# Patient Record
Sex: MALE | Race: WHITE
[De-identification: names, ages, dates, MRNs, and addresses within clinical notes are randomized per-mention and may not be internally consistent; named-entity substitution may affect disease eponyms.]

---

## 2017-09-01 ENCOUNTER — HOSPITAL ENCOUNTER (OUTPATIENT)
Dept: HOSPITAL 11 - JP.ED | Age: 72
Setting detail: OBSERVATION
LOS: 3 days | Discharge: HOME | End: 2017-09-04
Attending: INTERNAL MEDICINE | Admitting: FAMILY MEDICINE
Payer: COMMERCIAL

## 2017-09-01 DIAGNOSIS — Z95.5: ICD-10-CM

## 2017-09-01 DIAGNOSIS — I10: ICD-10-CM

## 2017-09-01 DIAGNOSIS — E78.5: ICD-10-CM

## 2017-09-01 DIAGNOSIS — F17.210: ICD-10-CM

## 2017-09-01 DIAGNOSIS — E11.9: ICD-10-CM

## 2017-09-01 DIAGNOSIS — R06.02: ICD-10-CM

## 2017-09-01 DIAGNOSIS — Z79.2: ICD-10-CM

## 2017-09-01 DIAGNOSIS — Z79.4: ICD-10-CM

## 2017-09-01 DIAGNOSIS — J44.9: ICD-10-CM

## 2017-09-01 DIAGNOSIS — G47.33: ICD-10-CM

## 2017-09-01 DIAGNOSIS — Z79.899: ICD-10-CM

## 2017-09-01 DIAGNOSIS — A77.49: Primary | ICD-10-CM
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
## 2017-09-01 PROCEDURE — 71010: CPT

## 2017-09-01 PROCEDURE — 36600 WITHDRAWAL OF ARTERIAL BLOOD: CPT

## 2017-09-01 PROCEDURE — 36415 COLL VENOUS BLD VENIPUNCTURE: CPT

## 2017-09-01 PROCEDURE — 96366 THER/PROPH/DIAG IV INF ADDON: CPT

## 2017-09-01 PROCEDURE — 82962 GLUCOSE BLOOD TEST: CPT

## 2017-09-01 PROCEDURE — 83880 ASSAY OF NATRIURETIC PEPTIDE: CPT

## 2017-09-01 PROCEDURE — 81001 URINALYSIS AUTO W/SCOPE: CPT

## 2017-09-01 PROCEDURE — 84484 ASSAY OF TROPONIN QUANT: CPT

## 2017-09-01 PROCEDURE — 99285 EMERGENCY DEPT VISIT HI MDM: CPT

## 2017-09-01 PROCEDURE — 82803 BLOOD GASES ANY COMBINATION: CPT

## 2017-09-01 PROCEDURE — 94640 AIRWAY INHALATION TREATMENT: CPT

## 2017-09-01 PROCEDURE — 80053 COMPREHEN METABOLIC PANEL: CPT

## 2017-09-01 PROCEDURE — 80048 BASIC METABOLIC PNL TOTAL CA: CPT

## 2017-09-01 PROCEDURE — 86140 C-REACTIVE PROTEIN: CPT

## 2017-09-01 PROCEDURE — 85027 COMPLETE CBC AUTOMATED: CPT

## 2017-09-01 PROCEDURE — 83605 ASSAY OF LACTIC ACID: CPT

## 2017-09-01 PROCEDURE — G0378 HOSPITAL OBSERVATION PER HR: HCPCS

## 2017-09-01 PROCEDURE — 86618 LYME DISEASE ANTIBODY: CPT

## 2017-09-01 PROCEDURE — 96375 TX/PRO/DX INJ NEW DRUG ADDON: CPT

## 2017-09-01 PROCEDURE — 86753 PROTOZOA ANTIBODY NOS: CPT

## 2017-09-01 PROCEDURE — 86666 EHRLICHIA ANTIBODY: CPT

## 2017-09-01 PROCEDURE — 96361 HYDRATE IV INFUSION ADD-ON: CPT

## 2017-09-01 PROCEDURE — 96367 TX/PROPH/DG ADDL SEQ IV INF: CPT

## 2017-09-01 PROCEDURE — 96365 THER/PROPH/DIAG IV INF INIT: CPT

## 2017-09-01 PROCEDURE — 85025 COMPLETE CBC W/AUTO DIFF WBC: CPT

## 2017-09-01 RX ADMIN — INSULIN DETEMIR SCH UNIT: 100 INJECTION, SOLUTION SUBCUTANEOUS at 22:27

## 2017-09-01 RX ADMIN — METOPROLOL TARTRATE SCH MG: 50 TABLET, FILM COATED ORAL at 22:28
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
## 2017-09-02 RX ADMIN — METOPROLOL TARTRATE SCH: 50 TABLET, FILM COATED ORAL at 13:01

## 2017-09-02 RX ADMIN — ATORVASTATIN CALCIUM SCH MG: 20 TABLET, FILM COATED ORAL at 20:12

## 2017-09-02 RX ADMIN — Medication SCH MG: at 20:16

## 2017-09-02 RX ADMIN — MULTIPLE VITAMINS W/ MINERALS TAB SCH: TAB at 13:01

## 2017-09-02 RX ADMIN — Medication SCH PUFF: at 20:19

## 2017-09-02 RX ADMIN — Medication SCH CAP: at 09:28

## 2017-09-02 RX ADMIN — TRAZODONE HYDROCHLORIDE SCH MG: 50 TABLET ORAL at 20:12

## 2017-09-02 RX ADMIN — INSULIN DETEMIR SCH UNIT: 100 INJECTION, SOLUTION SUBCUTANEOUS at 20:23

## 2017-09-02 RX ADMIN — METOPROLOL TARTRATE SCH MG: 50 TABLET, FILM COATED ORAL at 20:13

## 2017-09-02 RX ADMIN — Medication SCH EACH: at 20:15

## 2017-09-02 RX ADMIN — LATANOPROST SCH DROP: 50 SOLUTION OPHTHALMIC at 20:17

## 2017-09-02 RX ADMIN — Medication SCH PUFF: at 09:29

## 2017-09-02 RX ADMIN — Medication SCH EACH: at 00:05
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
## 2017-09-03 RX ADMIN — Medication SCH: at 21:30

## 2017-09-03 RX ADMIN — Medication SCH CAP: at 08:47

## 2017-09-03 RX ADMIN — TRAZODONE HYDROCHLORIDE SCH MG: 50 TABLET ORAL at 21:31

## 2017-09-03 RX ADMIN — INSULIN DETEMIR SCH UNIT: 100 INJECTION, SOLUTION SUBCUTANEOUS at 21:27

## 2017-09-03 RX ADMIN — MULTIPLE VITAMINS W/ MINERALS TAB SCH TAB: TAB at 12:18

## 2017-09-03 RX ADMIN — METOPROLOL TARTRATE SCH MG: 50 TABLET, FILM COATED ORAL at 08:48

## 2017-09-03 RX ADMIN — METOPROLOL TARTRATE SCH MG: 50 TABLET, FILM COATED ORAL at 21:29

## 2017-09-03 RX ADMIN — Medication SCH PUFF: at 07:17

## 2017-09-03 RX ADMIN — LATANOPROST SCH DROP: 50 SOLUTION OPHTHALMIC at 21:33

## 2017-09-03 RX ADMIN — ATORVASTATIN CALCIUM SCH MG: 20 TABLET, FILM COATED ORAL at 21:28

## 2017-09-03 RX ADMIN — Medication SCH PUFF: at 21:26

## 2017-09-03 RX ADMIN — Medication SCH EACH: at 08:47

## 2017-09-03 RX ADMIN — Medication SCH MG: at 21:32
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
## 2017-09-04 VITALS — SYSTOLIC BLOOD PRESSURE: 140 MMHG | DIASTOLIC BLOOD PRESSURE: 68 MMHG

## 2017-09-04 RX ADMIN — Medication SCH PUFF: at 07:30

## 2017-09-04 RX ADMIN — Medication SCH EACH: at 09:12

## 2017-09-04 RX ADMIN — METOPROLOL TARTRATE SCH MG: 50 TABLET, FILM COATED ORAL at 09:08

## 2017-09-04 RX ADMIN — MULTIPLE VITAMINS W/ MINERALS TAB SCH TAB: TAB at 09:13

## 2017-09-04 RX ADMIN — Medication SCH CAP: at 09:11
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
## 2017-09-05 NOTE — CR
Chest 1V Frontal

 

HISTORY: Fever, shortness of breath.

 

COMPARISON: CT scan 8/31/2017

 

FINDINGS: There is mild cardiomegaly. Borderline congestive change. No dense infiltrate or effusion.
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
## 2019-08-08 NOTE — CRLCR
INDICATION:



Fall with left hip pain.



TECHNIQUE:



AP view pelvis, 2 views of the left hip.



COMPARISON:



None.



FINDINGS:



Hardware components of left total hip arthroplasty. Subtrochanteric 

obliquely oriented periprosthetic left femur fracture. Mild lateral 

displacement of the distal fracture fragment measuring approximately 1 cm. 

No evidence of dislocation of the left hip joint. Moderate to severe right 

hip joint space narrowing and osteophyte formation. Surgical clips project 

over the pelvis. Bones are demineralized.



IMPRESSION:



Periprosthetic subtrochanteric left femur fracture as above.



Dictated by Mehul Hurd MD @ Aug  8 2019 11:40AM



Signed by Dr. Mehul Hurd @ Aug  8 2019 11:44AM
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
## 2019-09-03 NOTE — CRLCR
Indication:



Injury and pain



Technique:



Left hip 3 views



Comparison:



August 30, 2019 



Findings:



Bones: Hardware from a left hip arthroplasty remains in satisfactory 

position. Again demonstrated is an incompletely healed subtrochanteric 

fracture. Subtle nondisplaced fracture adjacent to the tip of the 

intramedullary skylar is unchanged. No new or worsening fracture. Alignment is 

normal. 



Soft tissues: Unremarkable.  



Impression:



No new abnormality and no change from the prior exam.



Dictated by Capo Holly MD @ Sep  3 2019  5:50PM



Signed by Dr. Capo Holly @ Sep  3 2019  5:54PM

## 2019-12-09 NOTE — EDM.PDOC
ED HPI GENERAL MEDICAL PROBLEM





- General


Chief Complaint: Lower Extremity Injury/Pain


Stated Complaint: FELL AT HOME


Time Seen by Provider: 12/09/19 08:11


Source of Information: Reports: Patient, EMS


History Limitations: Reports: No Limitations





- History of Present Illness


INITIAL COMMENTS - FREE TEXT/NARRATIVE: 





73-year-old male with left hip pain after falling in the shower.  He has 

apparent deformity of the left lower extremity and severe pain just below the 

left hip.  No other injury.


Onset: Sudden


Duration: Hour(s): (Within the last 2 hours)


Location: Reports: Lower Extremity, Left


Associated Symptoms: Reports: Confusion


  ** Left Hip


Pain Score (Numeric/FACES): 8





- Related Data


 Allergies











Allergy/AdvReac Type Severity Reaction Status Date / Time


 


No Known Allergies Allergy   Verified 12/09/19 08:22











Home Meds: 


 Home Meds





Albuterol [Ventolin HFA] 2 puff INH Q4H PRN 03/31/15 [History]


Insulin Glarg,Human.Rec.Analog [Lantus Solostar] 30 unit SUBCUT BEDTIME 03/31/

15 [History]


Lisinopril 20 mg PO DAILY 03/31/15 [History]


Metoprolol Tartrate 50 mg PO BID 03/31/15 [History]


amLODIPine [Norvasc] 10 mg PO BEDTIME 03/31/15 [History]


atorvaSTATin [Lipitor] 40 mg PO BEDTIME 03/31/15 [History]


Latanoprost [Xalatan 0.005% Ophth Soln] 1 drop EYEBOTH DAILY 08/31/17 [History]


Nitroglycerin 0.4 mg SL ASDIRECTED PRN 03/15/19 [History]


Pramipexole Di-HCl [Mirapex] 0.25 mg PO DAILY 03/15/19 [History]


traZODone HCl [Trazodone HCl] 50 mg PO DAILY 03/15/19 [History]


*Potassium Chloride 99 mg PO BID 12/09/19 [History]


Aspirin [Halfprin] 1 tab PO BEDTIME 12/09/19 [History]


Budesonide/Formoterol [Symbicort 80-4.5 MCG] 2 puff INH BID 12/09/19 [History]


Cholecalciferol (Vitamin D3) [Vitamin D3] 1 tab PO DAILY 12/09/19 [History]


Escitalopram [Lexapro] 5 mg PO BEDTIME 12/09/19 [History]


Furosemide [Lasix] 20 mg PO DAILY 12/09/19 [History]


Glucosam/Chond/Collagen/Hyalur [Glucosamine Chondroitin] 2 cap PO DAILY 12/09/ 19 [History]


Multivitamin [Multivitamins] 1 tab PO DAILY 12/09/19 [History]


Naproxen 1 tab PO BID PRN 12/09/19 [History]


Vitamin B Complex [Super B-50 Complex] 1 cap PO DAILY 12/09/19 [History]











Past Medical History


HEENT History: Reports: Cataract, Glaucoma, Hard of Hearing, Impaired Vision


Cardiovascular History: Reports: High Cholesterol, Hypertension


Respiratory History: Reports: COPD, Sleep Apnea


Gastrointestinal History: Reports: None


Musculoskeletal History: Reports: Back Pain, Chronic, Osteoarthritis, Other (

See Below)


Other Musculoskeletal History: L hip pain


Neurological History: Reports: CVA


Psychiatric History: Reports: None


Endocrine/Metabolic History: Reports: Diabetes, Type II


Hematologic History: Reports: None


Immunologic History: Reports: None


Oncologic (Cancer) History: Reports: Colon, Metastatic, Prostate, Renal


Dermatologic History: Reports: None





- Past Surgical History


Head Surgeries/Procedures: Reports: None


HEENT Surgical History: Reports: Cataract Surgery


Cardiovascular Surgical History: Reports: Coronary Artery Stent


GI Surgical History: Reports: Colonoscopy, ERCP


Male  Surgical History: Reports: Other (See Below)


Musculoskeletal Surgical History: Reports: Hip Replacement





Social & Family History





- Caffeine Use


Caffeine Use: Reports: Coffee





Review of Systems





- Review of Systems


Review Of Systems: See Below


Constitutional: Denies: Fever


Respiratory: Denies: Shortness of Breath


Cardiovascular: Denies: Chest Pain, Palpitations


Musculoskeletal: Reports: Other (Severe left hip and leg pain, especially with 

movement)


Skin: Denies: Bruising





ED EXAM, GENERAL





- Physical Exam


Exam: See Below


Exam Limited By: No Limitations


General Appearance: Alert, Moderate Distress (Moderate to severe distress with 

any movement, fairly comfortable when lying still)


Respiratory/Chest: No Respiratory Distress


Cardiovascular: Regular Rate, Rhythm


GI/Abdominal: Soft, Non-Tender


Extremities: Other (Left leg shows internal rotation and slight shortening, 

severe tenderness to any palpation of the mid to upper femur left side)





Course





- Vital Signs


Last Recorded V/S: 


 Last Vital Signs











Temp  97.3 F   12/09/19 08:22


 


Pulse  83   12/09/19 10:14


 


Resp  16   12/09/19 10:14


 


BP  143/66 H  12/09/19 10:14


 


Pulse Ox  92 L  12/09/19 10:14














- Orders/Labs/Meds


Meds: 


Medications














Discontinued Medications














Generic Name Dose Route Start Last Admin





  Trade Name Karl  PRN Reason Stop Dose Admin


 


Hydromorphone HCl  1 mg  12/09/19 09:34  12/09/19 09:43





  Dilaudid  IVPUSH  12/09/19 09:35  1 mg





  ONETIME ONE   Administration





     





     





     





     














- Re-Assessments/Exams


Free Text/Narrative Re-Assessment/Exam: 





12/09/19 08:52


X-ray confirmed a new spiral fracture through the femur at the distal and of 

his prosthesis.  These films were reviewed by our orthopedic surgeon Dr. Fairchild.


12/09/19 10:16


Our facility does not have the proper orthopedic supplies to complete the 

surgery.  [facility] in [place] was contacted, Dr. Freeman kindly accepted his 

transfer at 09:55, he will be reevaluated in the emergency room prior to 

admission for orthopedic assessment and care.  An additional 1 mg of IV 

Dilaudid was needed.








Departure





- Departure


Time of Disposition: 10:59


Disposition: DC/Tfer to Other 


Clinical Impression: 


Periprosthetic fracture of hip


Qualifiers:


 Encounter type: initial encounter Qualified Code(s): M97.8XXA - Periprosthetic 

fracture around other internal prosthetic joint, initial encounter








- Discharge Information


Referrals: 


PCP,None [Primary Care Provider] - 


Forms:  ED Department Discharge


Care Plan Goals: 


Patient is to be transferred by EMS to [facility] for orthopedic evaluation 

and definitive care for a spiral left femur fracture, periprosthetic.

## 2019-12-09 NOTE — CR
Hip Min 2V or 3V Lt

 

CLINICAL HISTORY: Left hip pain

 

FINDINGS: Patient has a total left hip arthroplasty. There has been revision

with a subtrochanteric fracture that was reduced. The there is now a new oblique

displaced fracture of the mid femur towards the distal aspect of the

intramedullary skylar

 

IMPRESSION: New oblique displaced fracture through the mid femoral shaft near

the distal aspect of the intramedullary skylar.

## 2020-10-29 NOTE — EDM.PDOC
ED HPI GENERAL MEDICAL PROBLEM





- General


Chief Complaint: Back Pain or Injury


Stated Complaint: BACK PAIN


Time Seen by Provider: 10/29/20 11:50


Source of Information: Reports: Patient, Family


History Limitations: Reports: Other (Patient has Alzheimer's dementia limiting 

the full scope of his history.)





- History of Present Illness


INITIAL COMMENTS - FREE TEXT/NARRATIVE: 


Nabor is a 74-year-old male who is a [facility] patient presenting to the ED for 

evaluation of low back pain.  The patient reportedly had a fall 3 weeks ago 

resulting in acute low back pain that has been migrating between the lumbar and 

thoracic spine and worsening.  The patient has Alzheimer's dementia limiting his

reporting of pain, however, his daughter accompanies him and states that he has 

been having increasing pain especially in the low back since the fall.  The 

patient cries out with any jarring movements like hitting bumps in the vehicle. 

He has not had any change of bowel or bladder control issues.  He normally 

attends the [facility], however, they referred him to the local ER for 

evaluation.  The patient does have a history of radiation to the lower spine for

prostate cancer.  The daughter is also concerned that perhaps there is 

metastasis of the prostate cancer to the lumbar spine causing the pain.  He 

denies any numbness or tingling in the legs.  He denies any weakness of the leg

s.  He denies any saddle anesthesia.  He denies any other injury from the fall.





Context: Reports: Trauma





- Related Data


                                    Allergies











Allergy/AdvReac Type Severity Reaction Status Date / Time


 


No Known Allergies Allergy   Verified 10/29/20 11:48











Home Meds: 


                                    Home Meds





Albuterol [Ventolin HFA] 2 puff INH Q4H PRN 03/31/15 [History]


Insulin Glarg,Human.Rec.Analog [Lantus Solostar] 30 unit SUBCUT BEDTIME 03/31/15

[History]


Lisinopril 10 mg PO DAILY 03/31/15 [History]


Metoprolol Tartrate 100 mg PO BID 03/31/15 [History]


amLODIPine [Norvasc] 5 mg PO BEDTIME 03/31/15 [History]


atorvaSTATin [Lipitor] 80 mg PO BEDTIME 03/31/15 [History]


Latanoprost [Xalatan 0.005% Ophth Soln] 1 drop EYEBOTH DAILY 08/31/17 [History]


Nitroglycerin 0.4 mg SL ASDIRECTED PRN 03/15/19 [History]


Pramipexole Di-HCl [Mirapex] 0.25 mg PO DAILY 03/15/19 [History]


traZODone HCl [Trazodone HCl] 100 mg PO DAILY 03/15/19 [History]


*Potassium Chloride 99 mg PO DAILY 12/09/19 [History]


Aspirin [Halfprin] 81 mg PO BEDTIME 12/09/19 [History]


Budesonide/Formoterol [Symbicort 80-4.5 MCG] 2 puff INH BID 12/09/19 [History]


Cholecalciferol (Vitamin D3) [Vitamin D3] 6,000 unit PO DAILY 12/09/19 [History]


Escitalopram [Lexapro] 10 mg PO BEDTIME 12/09/19 [History]


Furosemide [Lasix] 20 mg PO DAILY 12/09/19 [History]


Glucosam/Chond/Collagen/Hyalur [Glucosamine Chondroitin] 1 cap PO DAILY 12/09/19

[History]


Multivitamin [Multivitamins] 1 tab PO DAILY 12/09/19 [History]


Naproxen 1 tab PO BID PRN 12/09/19 [History]


Vitamin B Complex [Super B-50 Complex] 1 cap PO DAILY 12/09/19 [History]


Acetaminophen [Tylenol Extra Strength] 1,000 mg PO Q6H PRN 01/27/20 [History]


Calcium Carbonate/Vitamin D3 [Calcium 600 + Vit D 200] 1 tab PO DAILY 01/27/20 

[History]


Clopidogrel [Plavix] 75 mg PO DAILY 01/27/20 [History]


Leuprolide [Lupron Depot] 22.5 mg IM .EVERY 6 MONTHS 01/27/20 [History]


Lidocaine [Lidoderm] 1 patch TOP Q12H 01/27/20 [History]


Loperamide [Imodium] 2 mg PO ASDIRECTED 01/27/20 [History]


Cyclobenzaprine HCl 10 mg PO Q8HR PRN 30 Days #30 tablet 10/29/20 [Rx]











Past Medical History


HEENT History: Reports: Cataract, Glaucoma, Hard of Hearing, Impaired Vision


Cardiovascular History: Reports: High Cholesterol, Hypertension, Stents


Respiratory History: Reports: COPD, Sleep Apnea


Gastrointestinal History: Reports: None


Genitourinary History: Reports: Urinary Incontinence


Musculoskeletal History: Reports: Back Pain, Chronic, Osteoarthritis, Other (See

 Below)


Other Musculoskeletal History: L hip pain


Neurological History: Reports: Alzheimers Disease, CVA, Other (See Below)


Other Neuro History: dementia


Psychiatric History: Reports: None


Endocrine/Metabolic History: Reports: Diabetes, Type II


Hematologic History: Reports: Anticoagulation Therapy, Blood Transfusion(s)


Immunologic History: Reports: None


Oncologic (Cancer) History: Reports: Colon, Metastatic, Prostate, Renal


Dermatologic History: Reports: None





- Past Surgical History


Head Surgeries/Procedures: Reports: None


HEENT Surgical History: Reports: Cataract Surgery


Cardiovascular Surgical History: Reports: Coronary Artery Stent


Respiratory Surgical History: Reports: None


GI Surgical History: Reports: Colonoscopy, ERCP


Endocrine Surgical History: Reports: None


Musculoskeletal Surgical History: Reports: Hip Replacement


Other Musculoskeletal Surgeries/Procedures:: left hip and femur ORIF


Oncologic Surgical History: Reports: None


Dermatological Surgical History: Reports: None





Social & Family History





- Tobacco Use


Tobacco Use Status *Q: Current Every Day Tobacco User


Years of Tobacco use: 60


Packs/Tins Daily: 1


Used Tobacco, but Quit: No


Second Hand Smoke Exposure: No





- Caffeine Use


Caffeine Use: Reports: Coffee


Other Caffeine Use: 2 cups





- Alcohol Use


Days Per Week of Alcohol Use: 7


Number of Drinks Per Day: 2


Total Drinks Per Week: 14





- Recreational Drug Use


Recreational Drug Use: No





ED ROS GENERAL





- Review of Systems


Review Of Systems: See Below


Constitutional: Reports: No Symptoms


HEENT: Reports: No Symptoms


Respiratory: Reports: No Symptoms


Cardiovascular: Reports: No Symptoms


Endocrine: Reports: No Symptoms


GI/Abdominal: Reports: No Symptoms


: Reports: No Symptoms


Musculoskeletal: Reports: Back Pain (Migrating from the lumbar spine to thoracic

 spine.  The initial injury involve the lumbar spine.)


Skin: Reports: No Symptoms


Neurological: Reports: Other (Dementia, Alzheimer's type.)


Psychiatric: Reports: No Symptoms


Hematologic/Lymphatic: Reports: No Symptoms


Immunologic: Reports: No Symptoms





ED EXAM,LOWER BACK PAIN/INJURY





- Physical Exam


Exam: See Below


Exam Limited By: No Limitations


General Appearance: Alert, Moderate Distress


Eye Exam: Bilateral Eye: EOMI, PERRL


Head: Atraumatic, Normocephalic


Neck: Normal Inspection, Supple, Non-Tender, Full Range of Motion


Respiratory/Chest: No Respiratory Distress, Lungs Clear, Normal Breath Sounds, 

No Accessory Muscle Use, Chest Non-Tender


Cardiovascular: Normal Peripheral Pulses, Regular Rate, Rhythm, No JVD, No 

Murmur


Back Exam: Decreased Range of Motion (Due to pain and muscle spasm), Muscle 

Spasm, Paraspinal Tenderness, Vertebral Tenderness (Involving the lower 3-4 

lumbar vertebrae.  Significant paraspinal muscle spasm and tenderness as well.)


Extremities: Normal Inspection, Normal Range of Motion, No Pedal Edema


Neurological: Alert, Normal Mood/Affect, Normal Dorsiflexion, CN II-XII Intact, 

Normal Plantar Flexion, No Motor/Sensory Deficits, Oriented x 3, Abnormal Gait 

(Antalgic gait)


Psychiatric: Normal Affect, Normal Mood


Skin Exam: Warm, Dry, Intact


Lymphatic: No Adenopathy





Course





- Vital Signs


Last Recorded V/S: 


                                Last Vital Signs











Temp  36.4 C   10/29/20 11:48


 


Pulse  72   10/29/20 11:48


 


Resp  16   10/29/20 11:48


 


BP  167/89 H  10/29/20 11:48


 


Pulse Ox  98   10/29/20 11:48














- Orders/Labs/Meds


Meds: 


Medications














Discontinued Medications














Generic Name Dose Route Start Last Admin





  Trade Name Freq  PRN Reason Stop Dose Admin


 


Hydromorphone HCl  1 mg  10/29/20 12:09  10/29/20 12:12





  Dilaudid  IM  10/29/20 12:10  1 mg





  ONETIME ONE   Administration














- Radiology Interpretation


Free Text/Narrative:: 


CT of the lumbar spine demonstrated significant central spinal stenosis at L1-

L2, mild central stenosis L2-L3, L3-L4, and L4-L5 with moderate central stenosis

 at L5-S1.  In addition, there is significant neuroforaminal impingement at L4-

L5 and L5-S1 bilaterally.  No acute fractures identified.  No 

compression/wedging noted in the vertebrae.








Departure





- Departure


Time of Disposition: 16:29


Disposition: Home, Self-Care 01


Condition: Good


Clinical Impression: 


 Spinal stenosis of lumbar region, Lumbar disc disease with radiculopathy








- Discharge Information


*PRESCRIPTION DRUG MONITORING PROGRAM REVIEWED*: Not Applicable


*COPY OF PRESCRIPTION DRUG MONITORING REPORT IN PATIENT SUNIL: Not Applicable


Prescriptions: 


Cyclobenzaprine HCl 10 mg PO Q8HR PRN 30 Days #30 tablet


 PRN Reason: Muscle Spasm


Instructions:  Spinal Stenosis, Lumbosacral Radiculopathy


Referrals: 


Anoop Coyle Sr, MD [Primary Care Provider] - 


Forms:  ED Department Discharge


Care Plan Goals: 


Please stop the trazodone.  We will use a cyclobenzaprine 10 mg every 8 hours as

needed for muscle spasm control.  This may make him somnolent.  I had like him 

to follow-up with Dr. Coyle as needed.  You may double up on the use of the 

Lidoderm patch.  Should he develop any significant weakness, numbness or 

tingling, or worsening please return to the ED for reevaluation.





Sepsis Event Note (ED)





- Evaluation


Sepsis Screening Result: No Definite Risk





- Focused Exam


Vital Signs: 


                                   Vital Signs











  Temp Pulse Resp BP Pulse Ox


 


 10/29/20 11:48  36.4 C  72  16  167/89 H  98


 


 10/29/20 11:42  36.4 C  72  16  167/89 H  98














- Problem List & Annotations


(1) Lumbar disc disease with radiculopathy


SNOMED Code(s): 036339784


   Code(s): M51.16 - INTERVERTEBRAL DISC DISORDERS W RADICULOPATHY, LUMBAR 

REGION   Status: Acute   Priority: Medium   Current Visit: Yes   





(2) Spinal stenosis of lumbar region


SNOMED Code(s): 10745333


   Code(s): M48.061 - SPINAL STENOSIS, LUMBAR REGION WITHOUT NEUROGENIC CAPRICE   

Status: Acute   Priority: Medium   Current Visit: Yes   


Qualifiers: 


   Neurogenic claudication status: unspecified   Qualified Code(s): M48.061 - 

Spinal stenosis, lumbar region without neurogenic claudication   





- Problem List Review


Problem List Initiated/Reviewed/Updated: Yes





- Assessment/Plan


Plan: 





We will initiate antispasmodic therapy with cyclobenzaprine 10 mg every 8 hours 

as needed.  This will take place of the trazodone that the patient uses at 

nighttime for sleep.  In addition, I instructed the patient to double up on the 

use of the Lidoderm patches to get better coverage of the area of pain.  

Indications return to the ED were discussed.  This time patient stable for 

discharge in satisfactory condition.

## 2020-10-29 NOTE — CT
Lumbar Spine wo Cont

 

CLINICAL HISTORY: Increasing back pain, previous fall

 

COMPARISON: None available   

 

TECHNIQUE: Multiple contiguous axial sections were obtained of the lumbar spine

without the IV infusion of contrast material This was followed by sagittal and

coronal MPRs. Auto dosage reduction and iterative reconstruction techniques

employed.

 

FINDINGS: Sagittal images show the lumbar vertebral body heights to be

maintained throughout. There is some spondylosis most notable at L2-3. Alignment

is maintained.

Axial images show moderate concentric disc bulging at L1-2 with some facet

hypertrophy. This causes moderate central canal stenosis. 

There is diffuse bulging of the L2-3 disc with facet hypertrophy causing mild

central canal stenosis. 

There is concentric disc bulging at L3-4 with facet hypertrophy causing mild

central canal stenosis.

There is concentric disc bulging at L4-5 with facet hypertrophy causing moderate

central canal stenosis and encroachment on the neural foramina bilaterally.

There is concentric disc bulging at L5-S1 with facet hypertrophy causing mild

central canal stenosis with encroachment on the lateral recesses and neural

foramina greater on the right.

Incidental note of moderate atheromatous changes in the aorta.

 

 

IMPRESSION: No fracture or dislocation

 

Diffuse degenerative disc disease and facet osteoarthropathy

 

Multiple levels of central canal stenosis as well as lateral recess and neural

foraminal encroachment